# Patient Record
Sex: MALE | Race: OTHER | HISPANIC OR LATINO | URBAN - METROPOLITAN AREA
[De-identification: names, ages, dates, MRNs, and addresses within clinical notes are randomized per-mention and may not be internally consistent; named-entity substitution may affect disease eponyms.]

---

## 2023-01-16 ENCOUNTER — EMERGENCY (EMERGENCY)
Facility: HOSPITAL | Age: 33
LOS: 1 days | Discharge: LEFT BEFORE DISCHARGE | End: 2023-01-16
Attending: EMERGENCY MEDICINE | Admitting: EMERGENCY MEDICINE
Payer: SELF-PAY

## 2023-01-16 VITALS
DIASTOLIC BLOOD PRESSURE: 77 MMHG | HEART RATE: 103 BPM | OXYGEN SATURATION: 96 % | WEIGHT: 197.98 LBS | TEMPERATURE: 98 F | SYSTOLIC BLOOD PRESSURE: 141 MMHG | RESPIRATION RATE: 18 BRPM

## 2023-01-16 VITALS
SYSTOLIC BLOOD PRESSURE: 132 MMHG | DIASTOLIC BLOOD PRESSURE: 67 MMHG | RESPIRATION RATE: 18 BRPM | OXYGEN SATURATION: 97 % | HEART RATE: 77 BPM | TEMPERATURE: 98 F

## 2023-01-16 LAB
APPEARANCE UR: CLEAR — SIGNIFICANT CHANGE UP
BILIRUB UR-MCNC: NEGATIVE — SIGNIFICANT CHANGE UP
COLOR SPEC: YELLOW — SIGNIFICANT CHANGE UP
DIFF PNL FLD: NEGATIVE — SIGNIFICANT CHANGE UP
GLUCOSE UR QL: NEGATIVE — SIGNIFICANT CHANGE UP
KETONES UR-MCNC: NEGATIVE — SIGNIFICANT CHANGE UP
LEUKOCYTE ESTERASE UR-ACNC: NEGATIVE — SIGNIFICANT CHANGE UP
NITRITE UR-MCNC: NEGATIVE — SIGNIFICANT CHANGE UP
PH UR: 6 — SIGNIFICANT CHANGE UP (ref 5–8)
PROT UR-MCNC: NEGATIVE MG/DL — SIGNIFICANT CHANGE UP
SP GR SPEC: >=1.03 — SIGNIFICANT CHANGE UP (ref 1–1.03)
UROBILINOGEN FLD QL: 0.2 E.U./DL — SIGNIFICANT CHANGE UP

## 2023-01-16 PROCEDURE — 72125 CT NECK SPINE W/O DYE: CPT | Mod: MG

## 2023-01-16 PROCEDURE — G1004: CPT

## 2023-01-16 PROCEDURE — 70450 CT HEAD/BRAIN W/O DYE: CPT | Mod: 26,MG

## 2023-01-16 PROCEDURE — 81003 URINALYSIS AUTO W/O SCOPE: CPT

## 2023-01-16 PROCEDURE — 71046 X-RAY EXAM CHEST 2 VIEWS: CPT

## 2023-01-16 PROCEDURE — 71046 X-RAY EXAM CHEST 2 VIEWS: CPT | Mod: 26

## 2023-01-16 PROCEDURE — 76870 US EXAM SCROTUM: CPT

## 2023-01-16 PROCEDURE — 70450 CT HEAD/BRAIN W/O DYE: CPT | Mod: MG

## 2023-01-16 PROCEDURE — 76870 US EXAM SCROTUM: CPT | Mod: 26

## 2023-01-16 PROCEDURE — 99284 EMERGENCY DEPT VISIT MOD MDM: CPT | Mod: 25

## 2023-01-16 PROCEDURE — 70486 CT MAXILLOFACIAL W/O DYE: CPT | Mod: 26,MG

## 2023-01-16 PROCEDURE — 70486 CT MAXILLOFACIAL W/O DYE: CPT | Mod: MG

## 2023-01-16 PROCEDURE — 72125 CT NECK SPINE W/O DYE: CPT | Mod: 26,MG

## 2023-01-16 PROCEDURE — 99285 EMERGENCY DEPT VISIT HI MDM: CPT

## 2023-01-16 RX ORDER — ACETAMINOPHEN 500 MG
1000 TABLET ORAL ONCE
Refills: 0 | Status: COMPLETED | OUTPATIENT
Start: 2023-01-16 | End: 2023-01-16

## 2023-01-16 RX ADMIN — Medication 1000 MILLIGRAM(S): at 01:41
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
## 2023-01-16 NOTE — ED PROVIDER NOTE - NSFOLLOWUPINSTRUCTIONS_ED_ALL_ED_FT
YOU UNDERSTAND YOU ARE LEAVING PRIOR TO YOUR URINALYSIS AND CT/ULTRASOUND RESULTS. YOU UNDERSTAND THE RISK OF MISSED/WORSENING DISEASE, INCLUDING POSSIBLE DEATH. YOU MAY RETURN AT ANY TIME. OTHERWISE, YOU WILL HEAR BACK IN 24-72HR WITH ANY ABNORMAL RESULTS. YOU AGREE TO RETURN IF REQUIRED TO.    PLEASE RETURN TO THE EMERGENCY DEPARTMENT IF SEVERE HEADACHE NOT IMPROVED WITH TYLENOL, EYE PAIN, VISION CHANGES, FEVER, CHEST PAIN, SHORTNESS OF BREATH, ABDOMINAL PAIN, VOMITING, OTHER CONCERNING SYMPTOMS.    PLEASE CONTACT THONG CHEN ([facility] EMERGENCY DEPARTMENT CLINICAL REFERRAL COORDINATOR) TO ASSIST IN SCHEDULING YOUR FOLLOW-UP APPOINTMENT.    Monday - Friday 11am-7pm  [phone]  [email or web address]

## 2023-01-16 NOTE — ED ADULT NURSE NOTE - NS_SISCREENINGSR_GEN_ALL_ED
Left additional VM for patient relaying therapeutic INR result, warfarin dosing instructions, date of next INR.    Requested return call to anticoag clinic IF patient has any questions/concerns/updates.    Med changes? Should NOT affect INR per [employer].  - Intuniv dose increase?     Negative

## 2023-01-16 NOTE — ED PROVIDER NOTE - PATIENT PORTAL LINK FT
You can access the FollowMyHealth Patient Portal offered by [facility] by registering at the following website: [email or web address]. By joining [employer]’s FollowMyHealth portal, you will also be able to view your health information using other applications (apps) compatible with our system.

## 2023-01-16 NOTE — ED PROVIDER NOTE - PHYSICAL EXAMINATION
CONST: nontoxic NAD speaking in full sentences ambulating comfortably around ED  HEAD: atraumatic  EYES: +R periorbital swelling/ecchymoses, eyelids intact, no retained FB, conjunctivae clear, no en/exophthalamos, PERRL, no pupillary defect, EOMI, no pain w/ EOM, no hyphema, no eye tearing/discharge, pt lost contact lens for R eye but is able to see finger count at reported baseline distance when vision uncorrected, VF intact  ENT: +dried blood in bl nares, no sig septal deviation, no septal hematoma, oropharynx clear, mmm, no loose/avulsed dentition, tmj intact, tongue intact, no michaels sign, no clear rhinorrhea, no hemotympanum  NECK: supple/FROM, no midline ttp/stepoff/deformity  CARD: rrr no murmurs, no chest wall ttp/crepitus/deformity  CHEST: ctab no r/r/w  ABD: soft, nd, nttp, no rebound/guarding  : uncircumcised, no penile deformity/ttp/skin changes, symmetric descended testicles w/o ttp/swelling/ecchymoses  PELVIS: stable, hips nttp  BACK: no midline ttp/stepoff/deformity, no flank ecchymoses  EXT: compartments soft, nttp, FROM, symmetric distal pulses intact  SKIN: warm, dry, cap refill <2sec  NEURO: a+ox3, CN II-XII intact, 5/5 strength x4, gross sensation intact x4, baseline gait

## 2023-01-16 NOTE — ED PROVIDER NOTE - PROGRESS NOTE DETAILS
pt w/ full capacity requesting to leave AMA prior to ct results, us results, urinalysis. pt understands risk of missed/worsening disease, including possible mortality. questions answered. strict return precautions.
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
## 2023-01-19 DIAGNOSIS — R22.0 LOCALIZED SWELLING, MASS AND LUMP, HEAD: ICD-10-CM
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
## 2023-01-19 DIAGNOSIS — Z53.29 PROCEDURE AND TREATMENT NOT CARRIED OUT BECAUSE OF PATIENT'S DECISION FOR OTHER REASONS: ICD-10-CM

## 2023-01-19 DIAGNOSIS — J45.909 UNSPECIFIED ASTHMA, UNCOMPLICATED: ICD-10-CM

## 2023-01-19 DIAGNOSIS — S05.11XA CONTUSION OF EYEBALL AND ORBITAL TISSUES, RIGHT EYE, INITIAL ENCOUNTER: ICD-10-CM

## 2023-01-19 DIAGNOSIS — R04.0 EPISTAXIS: ICD-10-CM

## 2025-05-28 ENCOUNTER — EMERGENCY (EMERGENCY)
Facility: HOSPITAL | Age: 35
LOS: 1 days | End: 2025-05-28
Attending: STUDENT IN AN ORGANIZED HEALTH CARE EDUCATION/TRAINING PROGRAM | Admitting: STUDENT IN AN ORGANIZED HEALTH CARE EDUCATION/TRAINING PROGRAM
Payer: SELF-PAY

## 2025-05-28 VITALS
WEIGHT: 222.01 LBS | DIASTOLIC BLOOD PRESSURE: 100 MMHG | TEMPERATURE: 99 F | SYSTOLIC BLOOD PRESSURE: 163 MMHG | HEIGHT: 66 IN | OXYGEN SATURATION: 98 % | HEART RATE: 111 BPM | RESPIRATION RATE: 16 BRPM

## 2025-05-28 PROCEDURE — 99283 EMERGENCY DEPT VISIT LOW MDM: CPT

## 2025-05-28 PROCEDURE — 99053 MED SERV 10PM-8AM 24 HR FAC: CPT

## 2025-05-28 NOTE — ED PROVIDER NOTE - PATIENT PORTAL LINK FT
You can access the FollowMyHealth Patient Portal offered by [facility] by registering at the following website: [email or web address]. By joining [employer]’s FollowMyHealth portal, you will also be able to view your health information using other applications (apps) compatible with our system.
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
## 2025-05-28 NOTE — ED ADULT NURSE NOTE - CINV DISCH MEDS REVIEWED YN
Past History


Past Medical History:  Anxiety, Depression, Other


Additional Past Medical Histor:  Panic attacks


 (PRINCE MIN)


Past Surgical History:  No Surgical History


 (PRINCE MIN)


Smoking:  Non-smoker


Alcohol Use:  None


Drug Use:  None


 (PRINCE MIN)





General Adult


EDM:


Chief Complaint:  ANXIETY/PANIC ATTACK





HPI:


HPI:





Patient is a 41-year-old male who presents with dizziness that started today 

around 1:00 while he was sitting in his basement watching TV.  EMS was called 

but patient refused.  Patient states he tried to drive here but' started having 

a panic attack on the way and had to call EMS to bring him in.  Patient also 

reports he was feeling short of breath, chest pressure that radiated into his 

neck, and tingling in bilateral hands.  Patient also reports pressure behind 

bilateral eyes and rates it a 2 out of 10.   Nuys taking anything for pain or 

anxiety at home.  Patient states that he has had some congestion since Saturday 

and has been taking Mucinex D and NyQuil.  Patient denies nausea, vomiting, 

diarrhea.  Denies any issues with eating or drinking.  Patient's has history of 

anxiety and depression.


 (PRINCE MIN)





Review of Systems:


Review of Systems:


Constitutional:  Denies fever or chills 


Eyes:  Denies change in visual acuity 


HENT: Reports nasal congestion and pain behind bilateral eyes


Respiratory:  Denies cough or shortness of breath 


Cardiovascular: Reports chest pain denies edema


GI:  Denies abdominal pain, nausea, vomiting, bloody stools or diarrhea 


: Denies dysuria 


Musculoskeletal:  Denies back pain or joint pain 


Integument:  Denies rash 


Neurologic: Ports headache, denies focal weakness or sensory changes 


Endocrine:  Denies polyuria or polydipsia 


Lymphatic:  Denies swollen glands 


Psychiatric: Reports history of depression and anxiety


 (PRINCE MIN)





Allergies:


Allergies:





Allergies








Coded Allergies Type Severity Reaction Last Updated Verified


 


  No Known Drug Allergies    6/19/16 No








 (PRINCE MIN)





Physical Exam:


PE:





Constitutional: Well developed, well nourished, no acute distress, non-toxic 

appearance. []


HENT: Normocephalic, atraumatic, bilateral external ears normal, oropharynx 

moist, no oral exudates, nose normal. []


Eyes: PERRLA, EOMI, conjunctiva normal, no discharge. [] 


Neck: Normal range of motion, no tenderness, supple, no stridor. [] 


Cardiovascular:Heart rate regular rhythm, no murmur []


Lungs & Thorax:  Bilateral breath sounds clear to auscultation []


Abdomen: Bowel sounds normal, soft, no tenderness, no masses, no pulsatile 

masses. [] 


Skin: Warm, dry, no erythema, no rash. [] 


Back: No tenderness, no CVA tenderness. [] 


Extremities: No tenderness, no cyanosis, no clubbing, ROM intact, no edema. [] 


Neurologic: Alert and oriented X 3, normal motor function, normal sensory 

function, no focal deficits noted. []


Psychologic: Affect normal, judgement normal, mood normal. []


 (PRINCE MIN)





Current Patient Data:


Vital Signs:





                                   Vital Signs








  Date Time  Temp Pulse Resp B/P (MAP) Pulse Ox O2 Delivery O2 Flow Rate FiO2


 


1/13/21 13:45 97.8 74 16 120/81 (94) 98 Room Air  








 (PRINCE MIN)





EKG:


EKG:


Sinus rhythm, no specific ECG abnormalities.  Heart rate 66 bpm.  Intervals 

normal, axis normal.  No STEMI.  Read by Dr. Vaca at 1423 []


 (PRINCE MIN)





Radiology/Procedures:


Radiology/Procedures:


[]XR CHEST 1V 





INDICATION: Reason: chest pressure, sob / Spl. Instructions:  / History: . 





COMPARISON STUDY: None.





FINDINGS:





Lungs: Normal lung volume. No pulmonary mass or consolidation. The 

tracheobronchial tree and hilar structures are normal.





Pleura: No pleural effusion or pneumothorax.





Heart and Mediastinum: The cardiomediastinal silhouette is normal. The great 

vessels of the thorax are normal.





Bones and Soft Tissues: The bones and soft tissues are within normal limits.





IMPRESSION:  


No acute cardiopulmonary process.





Electronically signed by: Farhan Mauro MD (1/13/2021 3:14 PM) [id number]


 (PRINCE MIN)





Heart Score:


HEART Score for Chest Pain:  








HEART Score for Chest Pain Response (Comments) Value


 


History Slighlty/Non-Suspicious 0


 


ECG Normal 0


 


Age < 45 0


 


Risk Factors No Risk Factors 0


 


Troponin < Normal Limit 0


 


Total  0








Risk Factors:


Risk Factors:  DM, Current or recent (<one month) smoker, HTN, HLP, family 

history of CAD, obesity.


Risk Scores:


Score 0 - 3:  2.5% MACE over next 6 weeks - Discharge Home


Score 4 - 6:  20.3% MACE over next 6 weeks - Admit for Clinical Observation


Score 7 - 10:  72.7% MACE over next 6 weeks - Early Invasive Strategies


 (PRINCE MIN)





Course & Med Decision Making:


Course & Med Decision Making


Pertinent Labs and Imaging studies reviewed. (See chart for details)





[] 41-year-old man who presents with dizziness today that started at 1 PM while 

he was sitting in his basement watching TV.  Patient also reports chest pressure

 that radiates up into his neck, and shortness of breath.  She reports pressure 

behind his eyes.  Patient denies nausea, vomiting.





 Patient given aspirin and fluids.





EKG normal sinus rhythm.  All labs within normal limits.  Troponin negative.  D-

dimer and chest x-ray negative.  Heart score of 0.  Patient reports his symptoms

 have resolved other than the pressure behind his eyes.  Will order Toradol for 

pain.  


Patient is hemodynamically stable on discharge and able to ambulate on his own.


 (PRINCE MIN)


Course & Med Decision Making


Patient had a panic attack.  Patient is well-appearing without any distress.  

Denies suicidal or homicidal ideation.


 (BOBBY VACA MD)


Dragon Disclaimer:


Dragon Disclaimer:


This electronic medical record was generated, in whole or in part, using a voice

 recognition dictation system.


 (PRINCE MIN)





Departure


Departure:


Impression:  


   Primary Impression:  


   Anxiety


   Additional Impression:  


   Chest pressure


Disposition:  01 DC HOME SELF CARE/HOMELESS


Condition:  IMPROVED


Referrals:  


OVIDIO MATSON (PCP)


Patient Instructions:  Anxiety and Panic Attacks, Easy-to-Read





Additional Instructions:  


You were seen in the emergency room today for dizziness, chest pressure and a

nxiety.All of your labs were within normal limits and you EKG showed no 

abnormalities. Please follow up with your primary care physician for further 

management and possible referral. If your symptoms worsen or you have further 

concerns, return to the emergency room for evaluation. 





EMERGENCY DEPARTMENT GENERAL DISCHARGE INSTRUCTIONS





Thank you for coming to [facility] (ED) today and trusting us

 with you 


care.  We trust that you had a positivie experience in our Emergency Department.

  If you 


wish to speak to the department management, you may call the director at 

[phone].





YOUR FOLLOW UP INSTRUCTIONS ARE AS FOLLOWS:





1.  Do you have a private Doctor?  If you do not have a private doctor, please 

ask for a 


resource list of physicians or clinics that may be able to assist you with 

follow up care.





2.  The Emergency Physician has interpreted your x-rays.  The X-Ray specialist 

will also 


review them.  If there is a change in the findings, you will be notified in 48 

hours when at 


all possible.





3.  A lab test or culture has been done, your results will be reviewed and you 

will be 


notified if you need a change in treatment.





ADDITIONAL INSTRUCTIONS AND INFORMATION:





1.  Your care today has been supervised by a physician who is specially trained 

in emergency 


care.  Many problems require more than one evaluation for a complete diagnosis 

and 


treatment.  We recommend that you schedule your follow up appointment as 

recommended to 


ensure complete treatment of you illness or injury.  If you are unable to obtain

 follow up 


care and continue to have a problem, or if your condition worsens, we recommend 

that you 


return to the ED.





2.  We are not able to safely determine your condition over the phone nor are we

 able to 


give sound medical advice over the phone.  For these safety reasons, if you call

 for medical 


advice we will ask you to come to the ED for further evaluation.





3.  If you have any questions regarding these discharge instructions please call

 the ED at 


[phone].





SAFETY INFORMATION:





In the interest of safety, wellness, and injury prevention; we encourage you to 

wear your 


sealbelt, if you smoke; quite smoking, and we encourage family to use a 

protective helmet 


for bicycling and other sporting events that present an increased risk for head 

injury.





IF YOUR SYMPTOMS WORSEN OR NEW SYMPTOMS DEVELOP, OR YOU HAVE CONCERNS ABOUT YOUR

 CONDITION; 


OR IF YOUR CONDITION WORSENS WHILE YOU ARE WAITING FOR YOUR FOLLOW UP 

APPOINTMENT; EITHER 


CONTACT YOUR PRIMARY CARE DOCTOR, THE PHYSICIAN WHOSE NAME AND NUMBER YOU WERE 

GIVEN, OR 


RETURN TO THE ED IMMEDIATELY.











PRINCE MIN               Jan 13, 2021 14:04


BOBBY VACA MD               Jan 14, 2021 08:06
No
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
## 2025-05-28 NOTE — ED PROVIDER NOTE - CARE PLAN
1 Principal Discharge DX:	Encounter for well exam without abnormal findings of patient 18 years of age or older
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
## 2025-05-28 NOTE — ED PROVIDER NOTE - NSFOLLOWUPINSTRUCTIONS_ED_ALL_ED_FT
Return to ED for any new or worsening symptoms including but not limited to: development of chest pain, shortness of breath, fever, vomiting, focal numbness, weakness or tingling, any severe CP, headache, abdominal pain, back pain.

## 2025-05-30 DIAGNOSIS — Z01.89 ENCOUNTER FOR OTHER SPECIFIED SPECIAL EXAMINATIONS: ICD-10-CM

## 2025-05-30 DIAGNOSIS — R00.0 TACHYCARDIA, UNSPECIFIED: ICD-10-CM

## 2025-05-30 DIAGNOSIS — J45.909 UNSPECIFIED ASTHMA, UNCOMPLICATED: ICD-10-CM
